# Patient Record
Sex: FEMALE | Race: WHITE | NOT HISPANIC OR LATINO | Employment: PART TIME | URBAN - METROPOLITAN AREA
[De-identification: names, ages, dates, MRNs, and addresses within clinical notes are randomized per-mention and may not be internally consistent; named-entity substitution may affect disease eponyms.]

---

## 2017-01-25 ENCOUNTER — TRANSCRIBE ORDERS (OUTPATIENT)
Dept: ADMINISTRATIVE | Facility: HOSPITAL | Age: 37
End: 2017-01-25

## 2017-01-25 ENCOUNTER — HOSPITAL ENCOUNTER (OUTPATIENT)
Dept: ULTRASOUND IMAGING | Facility: HOSPITAL | Age: 37
Discharge: HOME | End: 2017-01-25
Attending: OBSTETRICS & GYNECOLOGY | Admitting: OBSTETRICS & GYNECOLOGY

## 2017-01-25 DIAGNOSIS — N64.4 BREAST TENDERNESS IN FEMALE: Primary | ICD-10-CM

## 2017-01-25 DIAGNOSIS — N64.4 BREAST TENDERNESS IN FEMALE: ICD-10-CM

## 2017-01-25 PROCEDURE — 76641 ULTRASOUND BREAST COMPLETE: CPT

## 2017-02-14 ENCOUNTER — OFFICE VISIT (OUTPATIENT)
Dept: MAMMOGRAPHY | Facility: CLINIC | Age: 37
End: 2017-02-14

## 2017-02-14 VITALS
BODY MASS INDEX: 23.64 KG/M2 | HEIGHT: 68 IN | OXYGEN SATURATION: 98 % | TEMPERATURE: 97.8 F | SYSTOLIC BLOOD PRESSURE: 108 MMHG | DIASTOLIC BLOOD PRESSURE: 58 MMHG | HEART RATE: 102 BPM | WEIGHT: 156 LBS

## 2017-02-14 DIAGNOSIS — N64.4 MASTODYNIA: Primary | ICD-10-CM

## 2017-02-14 PROCEDURE — 99203 OFFICE O/P NEW LOW 30 MIN: CPT | Performed by: SURGERY

## 2017-02-14 RX ORDER — PRENATAL VIT NO.126/IRON/FOLIC 28MG-0.8MG
TABLET ORAL DAILY
COMMUNITY
End: 2021-03-02

## 2017-02-14 NOTE — PROGRESS NOTES
Chief Complaint: Rayna Presley is a 36 y.o.. female here today for Mass (right breast)        History of Present Illness:  Patient presents with breast pain.  She is currently 21 weeks pregnant with her third child.  This began approximately 2 months ago and is located in the right breast in the medial aspect.  There was one episode of some crusty drainage on the nipple but otherwise she has had no other symptoms.  Her OB/GYN doctor thought there was perhaps a palpable lump and sent her for an ultrasound.  This did not reveal any abnormalities in that right breast.  The pain apparently was present every day with sons spells where it goes away for short periods of time.  It mostly hurts when the area is palpated but when it is hurting, she says that it is a 6 out of 10 on the pain scale.  Her family history is significant for a paternal relative with breast cancer in her 80s.      Review of Systems:  Review of Systems   Constitutional:        30 pound weight gain- 21 weeks pregnant   Neurological: Positive for headaches.   Psychiatric/Behavioral: The patient is nervous/anxious.    All other systems reviewed and are negative.       Past Medical and Surgical History:  Breast Biopsy History:  Patient has not had a breast biopsy in the past.  Breast Cancer HIstory:  Patient does not have a past medical history of breast cancer.  Breast Operations, and year:  None    History   Smoking Status   • Current Every Day Smoker   • Packs/day: 1.00   • Types: Cigarettes   • Start date: 1994   Smokeless Tobacco   • Not on file     Obstetric History:  Patient is premenopausal, first day of last period: 09/17/2016- currently 21 weeks pregnant  Number of pregnancies:3  Number of live births: 2  Number of abortions or miscarriages: 0  Age of delivery of first child: 20  Patient breast fed, for the following lenth of time:24 months  Length of time taking birth control pills:6 years  Patient has never taken hormone  replacement    Past Surgical History   Procedure Laterality Date   •  section         Past Medical History   Diagnosis Date   • Anxiety        Prior Hospitalizations, other than for surgery or childbirth, and year:  none    Social History:  Patient is .  Patient has 2 sons.    Family History:  Family History   Problem Relation Age of Onset   • Asthma Mother    • Bleeding Disorder Mother    • Clotting disorder Mother    • Diabetes Mother    • Heart attack Mother    • Hyperlipidemia Mother    • Thyroid cancer Father 55   • Alcohol abuse Brother    • Breast cancer Paternal Grandmother 80       Vital Signs:  Vitals:    17 1049   BP: 108/58   Pulse: 102   Temp: 97.8 °F (36.6 °C)   SpO2: 98%       Medications:    Current Outpatient Prescriptions:   •  Prenatal Vit-Min-FA-Fish Oil ([facility] PRENATAL GUMMY PO), Take 2 tablets by mouth daily., Disp: , Rfl:      Physical Examination:  General Appearance:   Patient is in no distress.  She is well kept and has an average build.   Psychiatric:  Patient with appropriate mood and affect. Alert and oriented to self, time, and place.    Breast, RIGHT:  medium sized, symmetric with the contralateral side.  Breast skin is without erythema, edema, rashes.  There are no visible abnormalities upon inspection during the arm-raising maneuver or with hands on hips in the sitting position. There is no nipple retraction, discharge or nipple/areolar skin changes.There are no masses palpable in the sitting or supine positions.  The patient was not tender when I palpated her today.  The area of concern was in the medial aspect of the breast particularly in the 2 to 4 o'clock position.    Breast, LEFT:  medium sized, symmetric with the contralateral side.  Breast skin is without erythema, edema, rashes.  There are no visible abnormalities upon inspection during the arm-raising maneuver or with hands on hips in the sitting position. There is no nipple retraction, discharge or  nipple/areolar skin changes.There are no masses palpable in the sitting or supine positions.    Lymphatic:  There is no axillary, cervical, infraclavicular, or supraclavicular adenopathy bilaterally.  Eyes:  Pupils are round and reactive to light.  Cardiovascular:  Heart rate and rhythm are regular.  Respiratory:  Lungs are clear bilaterally with no crackles or wheezes in any lung field.  Gastrointestinal:  Abdomen is soft, nondistended, and nontender. There are no scars from previous surgery.    Musculoskeletal:  Good strength in all 4 extremities.   There is good range of motion in both shoulders.    Skin:  There were some petechiae scattered across the upper chest.    Assessment:  Diagnoses and all orders for this visit:    Mastodynia    Other orders  -     Prenatal Vit-Fe Fumarate-FA (PRENATAL, CLASSIC, VITAMIN) 28-0.8 MG tablet tablet; Take  by mouth Daily.      Plan:  We discussed that the differential diagnosis of breast pain includes, but is not limited to: fibrocystic condition, macrocysts, fibroadenomas, breast cancer,  trauma to the breast or chest wall, inflammation or  other musculoskeletal disturbances of the chest wall.  There are no concerning findings on her examination today or on her most recent imaging for a focal cause of her pain- ie a mass, inflammation, or trauma. Both her exam and imaging are in good order. The most likely etiology of her breast pain is fibrocystic condition, meaning a hormonal responsiveness of the breast tissue.  We discussed that this pain can be aggravated by many things, including stress, caffeine, and fluctuations in hormone levels.  We discussed the most common interventions to alleviate her symptoms, including wearing a supportive bra, reducing caffeine intake, oral vitamin E 400 units qday or BID, or evening primrose oil 2000-3000mg orally daily. She understood.     She would appear to be mostly concerned about the possibility of something bad going on.  I have  reassured her that a negative physical examination, a negative ultrasound, and a history of pain only is an unlikely scenario for breast cancer.  With her pregnancy there is not much that we could do.  I suggested that she cut out the caffeine in her diet from coffee and tea.  If that does not help we could consider vitamin E or evening primrose oil but I have asked her to check with her OB/GYN doctor to be certain he would consider that safe to do.  She has been encouraged to continue self breast examination and I will see her on an as-needed basis.      CPT coding:    Next Appointment:  No Follow-up on file.

## 2017-06-18 ENCOUNTER — HOSPITAL ENCOUNTER (INPATIENT)
Facility: HOSPITAL | Age: 37
LOS: 3 days | Discharge: HOME | End: 2017-06-21
Attending: OBSTETRICS & GYNECOLOGY | Admitting: OBSTETRICS & GYNECOLOGY

## 2017-06-18 ENCOUNTER — ANESTHESIA EVENT (OUTPATIENT)
Dept: LABOR AND DELIVERY | Facility: HOSPITAL | Age: 37
End: 2017-06-18

## 2017-06-18 ENCOUNTER — ANESTHESIA (OUTPATIENT)
Dept: LABOR AND DELIVERY | Facility: HOSPITAL | Age: 37
End: 2017-06-18

## 2017-06-18 PROBLEM — Z34.90 PREGNANCY: Status: ACTIVE | Noted: 2017-06-18

## 2017-06-18 LAB
ABO GROUP BLD: NORMAL
BASOPHILS # BLD AUTO: 0.01 10*3/MM3 (ref 0–0.2)
BASOPHILS # BLD AUTO: 0.02 10*3/MM3 (ref 0–0.2)
BASOPHILS NFR BLD AUTO: 0.1 % (ref 0–1.5)
BASOPHILS NFR BLD AUTO: 0.2 % (ref 0–1.5)
BLD GP AB SCN SERPL QL: NEGATIVE
DEPRECATED RDW RBC AUTO: 46.3 FL (ref 37–54)
DEPRECATED RDW RBC AUTO: 47.4 FL (ref 37–54)
EOSINOPHIL # BLD AUTO: 0.06 10*3/MM3 (ref 0–0.7)
EOSINOPHIL # BLD AUTO: 0.11 10*3/MM3 (ref 0–0.7)
EOSINOPHIL NFR BLD AUTO: 0.4 % (ref 0.3–6.2)
EOSINOPHIL NFR BLD AUTO: 1.1 % (ref 0.3–6.2)
ERYTHROCYTE [DISTWIDTH] IN BLOOD BY AUTOMATED COUNT: 13.9 % (ref 11.7–13)
ERYTHROCYTE [DISTWIDTH] IN BLOOD BY AUTOMATED COUNT: 14.1 % (ref 11.7–13)
HCT VFR BLD AUTO: 33.6 % (ref 35.6–45.5)
HCT VFR BLD AUTO: 34.4 % (ref 35.6–45.5)
HGB BLD-MCNC: 11.6 G/DL (ref 11.9–15.5)
HGB BLD-MCNC: 12 G/DL (ref 11.9–15.5)
IMM GRANULOCYTES # BLD: 0.07 10*3/MM3 (ref 0–0.03)
IMM GRANULOCYTES # BLD: 0.11 10*3/MM3 (ref 0–0.03)
IMM GRANULOCYTES NFR BLD: 0.5 % (ref 0–0.5)
IMM GRANULOCYTES NFR BLD: 1.1 % (ref 0–0.5)
LYMPHOCYTES # BLD AUTO: 1.83 10*3/MM3 (ref 0.9–4.8)
LYMPHOCYTES # BLD AUTO: 2.42 10*3/MM3 (ref 0.9–4.8)
LYMPHOCYTES NFR BLD AUTO: 13.2 % (ref 19.6–45.3)
LYMPHOCYTES NFR BLD AUTO: 23.3 % (ref 19.6–45.3)
MCH RBC QN AUTO: 32 PG (ref 26.9–32)
MCH RBC QN AUTO: 32.3 PG (ref 26.9–32)
MCHC RBC AUTO-ENTMCNC: 34.5 G/DL (ref 32.4–36.3)
MCHC RBC AUTO-ENTMCNC: 34.9 G/DL (ref 32.4–36.3)
MCV RBC AUTO: 91.7 FL (ref 80.5–98.2)
MCV RBC AUTO: 93.6 FL (ref 80.5–98.2)
MONOCYTES # BLD AUTO: 0.48 10*3/MM3 (ref 0.2–1.2)
MONOCYTES # BLD AUTO: 0.71 10*3/MM3 (ref 0.2–1.2)
MONOCYTES NFR BLD AUTO: 3.5 % (ref 5–12)
MONOCYTES NFR BLD AUTO: 6.8 % (ref 5–12)
NEUTROPHILS # BLD AUTO: 11.39 10*3/MM3 (ref 1.9–8.1)
NEUTROPHILS # BLD AUTO: 7 10*3/MM3 (ref 1.9–8.1)
NEUTROPHILS NFR BLD AUTO: 67.5 % (ref 42.7–76)
NEUTROPHILS NFR BLD AUTO: 82.3 % (ref 42.7–76)
PLATELET # BLD AUTO: 166 10*3/MM3 (ref 140–500)
PLATELET # BLD AUTO: 217 10*3/MM3 (ref 140–500)
PMV BLD AUTO: 9.6 FL (ref 6–12)
PMV BLD AUTO: 9.8 FL (ref 6–12)
RBC # BLD AUTO: 3.59 10*6/MM3 (ref 3.9–5.2)
RBC # BLD AUTO: 3.75 10*6/MM3 (ref 3.9–5.2)
RH BLD: POSITIVE
WBC NRBC COR # BLD: 10.37 10*3/MM3 (ref 4.5–10.7)
WBC NRBC COR # BLD: 13.84 10*3/MM3 (ref 4.5–10.7)

## 2017-06-18 PROCEDURE — 0UL70CZ OCCLUSION OF BILATERAL FALLOPIAN TUBES WITH EXTRALUMINAL DEVICE, OPEN APPROACH: ICD-10-PCS | Performed by: OBSTETRICS & GYNECOLOGY

## 2017-06-18 PROCEDURE — 85025 COMPLETE CBC W/AUTO DIFF WBC: CPT | Performed by: OBSTETRICS & GYNECOLOGY

## 2017-06-18 PROCEDURE — 94799 UNLISTED PULMONARY SVC/PX: CPT

## 2017-06-18 PROCEDURE — 25010000002 PHENYLEPHRINE PER 1 ML: Performed by: ANESTHESIOLOGY

## 2017-06-18 PROCEDURE — 25010000002 KETOROLAC TROMETHAMINE PER 15 MG: Performed by: ANESTHESIOLOGY

## 2017-06-18 PROCEDURE — 25010000003 CEFAZOLIN IN DEXTROSE 2-4 GM/100ML-% SOLUTION: Performed by: OBSTETRICS & GYNECOLOGY

## 2017-06-18 PROCEDURE — 86901 BLOOD TYPING SEROLOGIC RH(D): CPT | Performed by: OBSTETRICS & GYNECOLOGY

## 2017-06-18 PROCEDURE — 86900 BLOOD TYPING SEROLOGIC ABO: CPT | Performed by: OBSTETRICS & GYNECOLOGY

## 2017-06-18 PROCEDURE — 25010000002 ONDANSETRON PER 1 MG: Performed by: ANESTHESIOLOGY

## 2017-06-18 PROCEDURE — 86850 RBC ANTIBODY SCREEN: CPT | Performed by: OBSTETRICS & GYNECOLOGY

## 2017-06-18 PROCEDURE — 25010000002 MORPHINE PER 10 MG: Performed by: ANESTHESIOLOGY

## 2017-06-18 DEVICE — CLIP FALLOP FILSHIE TI PR STRL BX/20: Type: IMPLANTABLE DEVICE | Site: FALLOPIAN TUBE | Status: FUNCTIONAL

## 2017-06-18 RX ORDER — BISACODYL 5 MG/1
10 TABLET, DELAYED RELEASE ORAL DAILY PRN
Status: DISCONTINUED | OUTPATIENT
Start: 2017-06-18 | End: 2017-06-21 | Stop reason: HOSPADM

## 2017-06-18 RX ORDER — ONDANSETRON 2 MG/ML
4 INJECTION INTRAMUSCULAR; INTRAVENOUS ONCE AS NEEDED
Status: ACTIVE | OUTPATIENT
Start: 2017-06-18 | End: 2017-06-19

## 2017-06-18 RX ORDER — CARBOPROST TROMETHAMINE 250 UG/ML
250 INJECTION, SOLUTION INTRAMUSCULAR ONCE
Status: DISCONTINUED | OUTPATIENT
Start: 2017-06-18 | End: 2017-06-21 | Stop reason: HOSPADM

## 2017-06-18 RX ORDER — OXYTOCIN/RINGER'S LACTATE 10/500ML
125 PLASTIC BAG, INJECTION (ML) INTRAVENOUS CONTINUOUS PRN
Status: DISCONTINUED | OUTPATIENT
Start: 2017-06-18 | End: 2017-06-18 | Stop reason: HOSPADM

## 2017-06-18 RX ORDER — EPHEDRINE SULFATE 50 MG/ML
INJECTION, SOLUTION INTRAVENOUS AS NEEDED
Status: DISCONTINUED | OUTPATIENT
Start: 2017-06-18 | End: 2017-06-18 | Stop reason: SURG

## 2017-06-18 RX ORDER — BUPIVACAINE HYDROCHLORIDE 7.5 MG/ML
INJECTION, SOLUTION EPIDURAL; RETROBULBAR AS NEEDED
Status: DISCONTINUED | OUTPATIENT
Start: 2017-06-18 | End: 2017-06-18 | Stop reason: SURG

## 2017-06-18 RX ORDER — HYDROCODONE BITARTRATE AND ACETAMINOPHEN 10; 325 MG/1; MG/1
1 TABLET ORAL EVERY 4 HOURS PRN
Status: DISCONTINUED | OUTPATIENT
Start: 2017-06-18 | End: 2017-06-21 | Stop reason: HOSPADM

## 2017-06-18 RX ORDER — CEFAZOLIN SODIUM 2 G/100ML
2 INJECTION, SOLUTION INTRAVENOUS ONCE
Status: COMPLETED | OUTPATIENT
Start: 2017-06-18 | End: 2017-06-18

## 2017-06-18 RX ORDER — LIDOCAINE HYDROCHLORIDE 10 MG/ML
5 INJECTION, SOLUTION INFILTRATION; PERINEURAL AS NEEDED
Status: DISCONTINUED | OUTPATIENT
Start: 2017-06-18 | End: 2017-06-18 | Stop reason: HOSPADM

## 2017-06-18 RX ORDER — ACETAMINOPHEN 325 MG/1
650 TABLET ORAL EVERY 4 HOURS PRN
Status: DISCONTINUED | OUTPATIENT
Start: 2017-06-18 | End: 2017-06-21 | Stop reason: HOSPADM

## 2017-06-18 RX ORDER — PROMETHAZINE HYDROCHLORIDE 25 MG/ML
12.5 INJECTION, SOLUTION INTRAMUSCULAR; INTRAVENOUS EVERY 6 HOURS PRN
Status: DISCONTINUED | OUTPATIENT
Start: 2017-06-18 | End: 2017-06-21 | Stop reason: HOSPADM

## 2017-06-18 RX ORDER — PROMETHAZINE HYDROCHLORIDE 25 MG/1
12.5 TABLET ORAL EVERY 4 HOURS PRN
Status: DISCONTINUED | OUTPATIENT
Start: 2017-06-18 | End: 2017-06-21 | Stop reason: HOSPADM

## 2017-06-18 RX ORDER — MISOPROSTOL 200 UG/1
800 TABLET ORAL AS NEEDED
Status: DISCONTINUED | OUTPATIENT
Start: 2017-06-18 | End: 2017-06-18 | Stop reason: HOSPADM

## 2017-06-18 RX ORDER — HYDROXYZINE 50 MG/1
50 TABLET, FILM COATED ORAL EVERY 6 HOURS PRN
Status: DISCONTINUED | OUTPATIENT
Start: 2017-06-18 | End: 2017-06-21 | Stop reason: HOSPADM

## 2017-06-18 RX ORDER — DIPHENHYDRAMINE HCL 25 MG
25 CAPSULE ORAL EVERY 4 HOURS PRN
Status: DISCONTINUED | OUTPATIENT
Start: 2017-06-18 | End: 2017-06-21 | Stop reason: HOSPADM

## 2017-06-18 RX ORDER — IBUPROFEN 600 MG/1
600 TABLET ORAL EVERY 8 HOURS PRN
Status: DISCONTINUED | OUTPATIENT
Start: 2017-06-18 | End: 2017-06-21 | Stop reason: HOSPADM

## 2017-06-18 RX ORDER — DIPHENHYDRAMINE HYDROCHLORIDE 50 MG/ML
25 INJECTION INTRAMUSCULAR; INTRAVENOUS EVERY 4 HOURS PRN
Status: DISCONTINUED | OUTPATIENT
Start: 2017-06-18 | End: 2017-06-21 | Stop reason: HOSPADM

## 2017-06-18 RX ORDER — MORPHINE SULFATE 1 MG/ML
INJECTION, SOLUTION EPIDURAL; INTRATHECAL; INTRAVENOUS AS NEEDED
Status: DISCONTINUED | OUTPATIENT
Start: 2017-06-18 | End: 2017-06-18 | Stop reason: SURG

## 2017-06-18 RX ORDER — CEFAZOLIN SODIUM 2 G/100ML
2 INJECTION, SOLUTION INTRAVENOUS EVERY 8 HOURS
Status: DISCONTINUED | OUTPATIENT
Start: 2017-06-18 | End: 2017-06-19

## 2017-06-18 RX ORDER — NALOXONE HCL 0.4 MG/ML
0.2 VIAL (ML) INJECTION
Status: DISCONTINUED | OUTPATIENT
Start: 2017-06-18 | End: 2017-06-21 | Stop reason: HOSPADM

## 2017-06-18 RX ORDER — KETOROLAC TROMETHAMINE 30 MG/ML
INJECTION, SOLUTION INTRAMUSCULAR; INTRAVENOUS AS NEEDED
Status: DISCONTINUED | OUTPATIENT
Start: 2017-06-18 | End: 2017-06-18 | Stop reason: SURG

## 2017-06-18 RX ORDER — ONDANSETRON 2 MG/ML
INJECTION INTRAMUSCULAR; INTRAVENOUS AS NEEDED
Status: DISCONTINUED | OUTPATIENT
Start: 2017-06-18 | End: 2017-06-18 | Stop reason: SURG

## 2017-06-18 RX ORDER — METHYLERGONOVINE MALEATE 0.2 MG/ML
200 INJECTION INTRAVENOUS ONCE AS NEEDED
Status: DISCONTINUED | OUTPATIENT
Start: 2017-06-18 | End: 2017-06-18 | Stop reason: HOSPADM

## 2017-06-18 RX ORDER — OXYCODONE HYDROCHLORIDE AND ACETAMINOPHEN 5; 325 MG/1; MG/1
2 TABLET ORAL EVERY 4 HOURS PRN
Status: DISCONTINUED | OUTPATIENT
Start: 2017-06-18 | End: 2017-06-21 | Stop reason: HOSPADM

## 2017-06-18 RX ORDER — ZOLPIDEM TARTRATE 5 MG/1
5 TABLET ORAL NIGHTLY PRN
Status: DISCONTINUED | OUTPATIENT
Start: 2017-06-18 | End: 2017-06-21 | Stop reason: HOSPADM

## 2017-06-18 RX ORDER — PRENATAL VIT NO.126/IRON/FOLIC 28MG-0.8MG
1 TABLET ORAL DAILY
Status: DISCONTINUED | OUTPATIENT
Start: 2017-06-18 | End: 2017-06-21 | Stop reason: HOSPADM

## 2017-06-18 RX ORDER — MORPHINE SULFATE 1 MG/ML
INJECTION, SOLUTION EPIDURAL; INTRATHECAL; INTRAVENOUS
Status: DISPENSED
Start: 2017-06-18 | End: 2017-06-18

## 2017-06-18 RX ORDER — SIMETHICONE 80 MG
80 TABLET,CHEWABLE ORAL 4 TIMES DAILY PRN
Status: DISCONTINUED | OUTPATIENT
Start: 2017-06-18 | End: 2017-06-21 | Stop reason: HOSPADM

## 2017-06-18 RX ORDER — CEFAZOLIN SODIUM 2 G/100ML
2 INJECTION, SOLUTION INTRAVENOUS EVERY 8 HOURS
Status: DISCONTINUED | OUTPATIENT
Start: 2017-06-18 | End: 2017-06-18 | Stop reason: SDUPTHER

## 2017-06-18 RX ORDER — OXYTOCIN/RINGER'S LACTATE 10/500ML
999 PLASTIC BAG, INJECTION (ML) INTRAVENOUS ONCE
Status: COMPLETED | OUTPATIENT
Start: 2017-06-18 | End: 2017-06-18

## 2017-06-18 RX ORDER — SENNA AND DOCUSATE SODIUM 50; 8.6 MG/1; MG/1
1 TABLET, FILM COATED ORAL 2 TIMES DAILY
Status: DISCONTINUED | OUTPATIENT
Start: 2017-06-18 | End: 2017-06-21 | Stop reason: HOSPADM

## 2017-06-18 RX ORDER — SODIUM CHLORIDE 0.9 % (FLUSH) 0.9 %
1-10 SYRINGE (ML) INJECTION AS NEEDED
Status: DISCONTINUED | OUTPATIENT
Start: 2017-06-18 | End: 2017-06-18 | Stop reason: HOSPADM

## 2017-06-18 RX ORDER — CARBOPROST TROMETHAMINE 250 UG/ML
250 INJECTION, SOLUTION INTRAMUSCULAR AS NEEDED
Status: DISCONTINUED | OUTPATIENT
Start: 2017-06-18 | End: 2017-06-18 | Stop reason: HOSPADM

## 2017-06-18 RX ORDER — HYDROMORPHONE HYDROCHLORIDE 1 MG/ML
0.5 INJECTION, SOLUTION INTRAMUSCULAR; INTRAVENOUS; SUBCUTANEOUS
Status: DISCONTINUED | OUTPATIENT
Start: 2017-06-18 | End: 2017-06-18 | Stop reason: HOSPADM

## 2017-06-18 RX ORDER — PHYTONADIONE 1 MG/.5ML
INJECTION, EMULSION INTRAMUSCULAR; INTRAVENOUS; SUBCUTANEOUS
Status: DISPENSED
Start: 2017-06-18 | End: 2017-06-18

## 2017-06-18 RX ORDER — METHYLERGONOVINE MALEATE 0.2 MG/ML
200 INJECTION INTRAVENOUS ONCE
Status: DISCONTINUED | OUTPATIENT
Start: 2017-06-18 | End: 2017-06-21 | Stop reason: HOSPADM

## 2017-06-18 RX ORDER — MORPHINE SULFATE 2 MG/ML
2 INJECTION, SOLUTION INTRAMUSCULAR; INTRAVENOUS
Status: ACTIVE | OUTPATIENT
Start: 2017-06-18 | End: 2017-06-19

## 2017-06-18 RX ORDER — ONDANSETRON 2 MG/ML
4 INJECTION INTRAMUSCULAR; INTRAVENOUS EVERY 6 HOURS PRN
Status: DISCONTINUED | OUTPATIENT
Start: 2017-06-18 | End: 2017-06-21 | Stop reason: HOSPADM

## 2017-06-18 RX ORDER — SODIUM CHLORIDE, SODIUM LACTATE, POTASSIUM CHLORIDE, CALCIUM CHLORIDE 600; 310; 30; 20 MG/100ML; MG/100ML; MG/100ML; MG/100ML
125 INJECTION, SOLUTION INTRAVENOUS CONTINUOUS
Status: DISCONTINUED | OUTPATIENT
Start: 2017-06-18 | End: 2017-06-21 | Stop reason: HOSPADM

## 2017-06-18 RX ORDER — ERYTHROMYCIN 5 MG/G
OINTMENT OPHTHALMIC
Status: DISPENSED
Start: 2017-06-18 | End: 2017-06-18

## 2017-06-18 RX ADMIN — OXYTOCIN 750 ML/HR: 10 INJECTION, SOLUTION INTRAMUSCULAR; INTRAVENOUS at 04:19

## 2017-06-18 RX ADMIN — ONDANSETRON 4 MG: 2 INJECTION INTRAMUSCULAR; INTRAVENOUS at 03:40

## 2017-06-18 RX ADMIN — KETOROLAC TROMETHAMINE 30 MG: 30 INJECTION, SOLUTION INTRAMUSCULAR; INTRAVENOUS at 03:40

## 2017-06-18 RX ADMIN — SIMETHICONE CHEW TAB 80 MG 80 MG: 80 TABLET ORAL at 08:53

## 2017-06-18 RX ADMIN — Medication 1 TABLET: at 08:53

## 2017-06-18 RX ADMIN — BUPIVACAINE HYDROCHLORIDE 1.6 ML: 7.5 INJECTION, SOLUTION EPIDURAL; RETROBULBAR at 03:43

## 2017-06-18 RX ADMIN — PHENYLEPHRINE HYDROCHLORIDE 100 MCG: 10 INJECTION INTRAVENOUS at 03:50

## 2017-06-18 RX ADMIN — CEFAZOLIN SODIUM 2 G: 2 INJECTION, SOLUTION INTRAVENOUS at 12:24

## 2017-06-18 RX ADMIN — IBUPROFEN 600 MG: 600 TABLET ORAL at 16:43

## 2017-06-18 RX ADMIN — SIMETHICONE CHEW TAB 80 MG 80 MG: 80 TABLET ORAL at 12:25

## 2017-06-18 RX ADMIN — PHENYLEPHRINE HYDROCHLORIDE 100 MCG: 10 INJECTION INTRAVENOUS at 03:55

## 2017-06-18 RX ADMIN — IBUPROFEN 600 MG: 600 TABLET ORAL at 08:52

## 2017-06-18 RX ADMIN — EPHEDRINE SULFATE 10 MG: 50 INJECTION INTRAMUSCULAR; INTRAVENOUS; SUBCUTANEOUS at 04:16

## 2017-06-18 RX ADMIN — HYDROCODONE BITARTRATE AND ACETAMINOPHEN 1 TABLET: 10; 325 TABLET ORAL at 22:26

## 2017-06-18 RX ADMIN — CEFAZOLIN SODIUM 2 G: 2 INJECTION, SOLUTION INTRAVENOUS at 03:45

## 2017-06-18 RX ADMIN — CEFAZOLIN SODIUM 2 G: 2 INJECTION, SOLUTION INTRAVENOUS at 22:25

## 2017-06-18 RX ADMIN — OXYTOCIN 125 ML/HR: 10 INJECTION, SOLUTION INTRAMUSCULAR; INTRAVENOUS at 05:50

## 2017-06-18 RX ADMIN — DOCUSATE SODIUM,SENNOSIDES 1 TABLET: 50; 8.6 TABLET, FILM COATED ORAL at 08:53

## 2017-06-18 RX ADMIN — DOCUSATE SODIUM,SENNOSIDES 1 TABLET: 50; 8.6 TABLET, FILM COATED ORAL at 17:21

## 2017-06-18 RX ADMIN — OXYCODONE HYDROCHLORIDE AND ACETAMINOPHEN 2 TABLET: 5; 325 TABLET ORAL at 15:07

## 2017-06-18 RX ADMIN — OXYTOCIN 1500 ML/HR: 10 INJECTION, SOLUTION INTRAMUSCULAR; INTRAVENOUS at 03:59

## 2017-06-18 RX ADMIN — MORPHINE SULFATE 0.25 MG: 1 INJECTION EPIDURAL; INTRATHECAL; INTRAVENOUS at 03:43

## 2017-06-18 RX ADMIN — HYDROCODONE BITARTRATE AND ACETAMINOPHEN 1 TABLET: 10; 325 TABLET ORAL at 10:14

## 2017-06-18 RX ADMIN — SODIUM CHLORIDE, POTASSIUM CHLORIDE, SODIUM LACTATE AND CALCIUM CHLORIDE: 600; 310; 30; 20 INJECTION, SOLUTION INTRAVENOUS at 03:40

## 2017-06-18 RX ADMIN — SODIUM CHLORIDE, POTASSIUM CHLORIDE, SODIUM LACTATE AND CALCIUM CHLORIDE 1000 ML: 600; 310; 30; 20 INJECTION, SOLUTION INTRAVENOUS at 03:00

## 2017-06-18 RX ADMIN — SIMETHICONE CHEW TAB 80 MG 80 MG: 80 TABLET ORAL at 17:18

## 2017-06-18 NOTE — ANESTHESIA PROCEDURE NOTES
Intrathecal Block    Patient location during procedure: OR  Performed By  Anesthesiologist: NORA LANE  Preanesthetic Checklist  Completed: patient identified, site marked, surgical consent, pre-op evaluation, timeout performed, IV checked, risks and benefits discussed and monitors and equipment checked  Intrathecal Block Prep:  Pt Position:sitting  Sterile Tech:sterile barrier, gloves, cap and mask  Prep:chloraprep  Monitoring:blood pressure monitoring, continuous pulse oximetry and EKG  Intrathecal Block Procedure:  Approach:midline  Location:L3-L4  Needle Type:Princess  Needle Gauge:25 G  Placement of Needle Event: cerebrospinal fluid  Fluid Appearance:clear  Post Assessment:  Patient Tolerance:patient tolerated the procedure well with no apparent complications

## 2017-06-18 NOTE — OP NOTE
DATE OF PROCEDURE:  2017    SURGEON:  Nate Rankin MD     ASSISTANT: JAMAR Bee      PROCEDURES PERFORMED:  1. Repeat  section.   2. Tubal sterilization.     PREOPERATIVE DIAGNOSIS: Term pregnancy, labor with ruptured membranes.     POSTOPERATIVE DIAGNOSIS: Term pregnancy, labor with ruptured membranes.     DESCRIPTION OF PROCEDURE: Under uncomplicated spinal anesthesia with the patient in the supine position with left uterine displacement, abdominal prep and drape was performed. The abdomen was entered through the previous Pfannenstiel incision.  Moderate scarring was encountered. The abdominal cavity was reasonably free of scar.  An incision was made in the midline of the lower segment and widened laterally by finger pressure. The amniotic fluid was clear. The baby was in the vertex position.  This was a good size infant and there was a little trouble getting the head, but it was done successfully and the baby was an 8-pound 1-ounce female infant.  Apgar scores were not available at this time, but the baby is remaining with the mother in the recovery room. The placenta was removed and appeared intact.  The uterine incision was normal Bleeding was satisfactory. The uterine incision was closed with a running locking 0 Vicryl and several additional [id number] were used near the left angle.  The tubes and ovaries were grossly normal. Tubal sterilization was performed at the patient's request.  Filshie clips were used and 2 clips were placed on each tube next to each other. There was no bleeding and clip application was good. A small amount of blood was cleaned from the abdominal gutters. The peritoneum was closed with a running 0 Vicryl. The fascia was closed with a running 0 Vicryl. The subcutaneous was dry. The skin was closed with staples and a pressure dressing applied. Blood loss was normal estimated at 500 mL.  Counts were correct. There were no operative complications. The  patient tolerated the delivery well.     Nate Rankin M.D.  RLS:davey  D:   06/18/2017 05:22:52  T:   06/18/2017 07:00:47  Job ID:   [id number]  Document ID:   [id number]  cc:

## 2017-06-18 NOTE — ANESTHESIA POSTPROCEDURE EVALUATION
Patient: Rayna Presley    Procedure Summary     Date Anesthesia Start Anesthesia Stop Room / Location    17 0339 [id number]  [facility]       Procedure Diagnosis Surgeon Provider     SECTION REPEAT WITH TUBAL (N/A Abdomen) (pregnancy) MD Pablo Pantoja MD          Anesthesia Type: spinal  Last vitals  BP      Temp      Pulse     Resp      SpO2        Post Anesthesia Care and Evaluation    Patient location during evaluation: PACU  Anesthetic complications: No anesthetic complications

## 2017-06-18 NOTE — H&P
CHIEF COMPLAINT: The patient is scheduled for surgery on 2017, but she is in labor with ruptured membranes and is 4 cm, so she will be having her  now.     HISTORY OF PRESENT ILLNESS: The patient is a 36-year-old, white,  3, para 2. Her EDC is 2017 by an early ultrasound. Her 2 previous deliveries were by  after the first was for CPD with a 9-pound, 12-ounce boy. Her prenatal course has been free of serious complications. An early ultrasound showed a placenta previa, but this had resolved on a repeat scan in late 2017. Weight gain is normal. Blood pressures are good. Blood type is A positive. Rubella is immune. HIV, hepatitis and alpha-fetoprotein levels are negative, normal. Group B strep was negative. Blood sugar was normal. She is diane, is 4 cm dilated, and membranes ruptured. She will be prepared for surgery.     PHYSICAL EXAMINATION:  CHEST: Clear.   HEART: Normal sinus rhythm without murmur.   BREASTS: Nontender, no masses.   ABDOMEN: Uterus term size. Fetal heart tones normal.   PELVIC: As noted, the cervix is 4 cm with ruptured membranes and clear fluid.     IMPRESSION: Repeat  section at term with labor and ruptured membranes.     PLAN: She is being prepared for a repeat  and she is to have a tubal ligation.       Nate Rankin M.D.  RLS:terry  D:   2017 03:28:22  T:   2017 04:12:10  Job ID:   [id number]  Document ID:   [id number]  cc:   [facility]  [facility]

## 2017-06-18 NOTE — ANESTHESIA PREPROCEDURE EVALUATION
Anesthesia Evaluation     Patient summary reviewed   no history of anesthetic complications:  NPO Solid Status: > 6 hours       Airway   Mallampati: III  TM distance: >3 FB  Neck ROM: full  no difficulty expected  Dental - normal exam     Pulmonary - normal exam   (+) a smoker Current,   Cardiovascular - negative cardio ROS and normal exam        Neuro/Psych  (+) psychiatric history Anxiety,    GI/Hepatic/Renal/Endo      Musculoskeletal     Abdominal    Substance History      OB/GYN    (+) Pregnant (previous  in labor with srom),         Other        ROS/Med Hx Other: Hb 12.0  Plts 217                                  Anesthesia Plan    ASA 2     spinal     Anesthetic plan and risks discussed with patient.

## 2017-06-18 NOTE — LACTATION NOTE
P3 38 wks. Reports nursing previous babies who are 16 and 14 yrs old now. Reviewed  feeding patterns. Encouraged to call for any assist.

## 2017-06-19 PROCEDURE — 25010000003 CEFAZOLIN IN DEXTROSE 2-4 GM/100ML-% SOLUTION: Performed by: OBSTETRICS & GYNECOLOGY

## 2017-06-19 RX ADMIN — Medication 1 TABLET: at 09:19

## 2017-06-19 RX ADMIN — OXYCODONE HYDROCHLORIDE AND ACETAMINOPHEN 2 TABLET: 5; 325 TABLET ORAL at 02:34

## 2017-06-19 RX ADMIN — OXYCODONE HYDROCHLORIDE AND ACETAMINOPHEN 2 TABLET: 5; 325 TABLET ORAL at 18:42

## 2017-06-19 RX ADMIN — OXYCODONE HYDROCHLORIDE AND ACETAMINOPHEN 2 TABLET: 5; 325 TABLET ORAL at 14:33

## 2017-06-19 RX ADMIN — CEFAZOLIN SODIUM 2 G: 2 INJECTION, SOLUTION INTRAVENOUS at 07:12

## 2017-06-19 RX ADMIN — IBUPROFEN 600 MG: 600 TABLET ORAL at 14:33

## 2017-06-19 RX ADMIN — OXYCODONE HYDROCHLORIDE AND ACETAMINOPHEN 2 TABLET: 5; 325 TABLET ORAL at 22:40

## 2017-06-19 RX ADMIN — IBUPROFEN 600 MG: 600 TABLET ORAL at 22:40

## 2017-06-19 RX ADMIN — SIMETHICONE CHEW TAB 80 MG 80 MG: 80 TABLET ORAL at 09:20

## 2017-06-19 RX ADMIN — DOCUSATE SODIUM,SENNOSIDES 1 TABLET: 50; 8.6 TABLET, FILM COATED ORAL at 18:42

## 2017-06-19 RX ADMIN — IBUPROFEN 600 MG: 600 TABLET ORAL at 02:34

## 2017-06-19 RX ADMIN — DOCUSATE SODIUM,SENNOSIDES 1 TABLET: 50; 8.6 TABLET, FILM COATED ORAL at 09:19

## 2017-06-19 RX ADMIN — OXYCODONE HYDROCHLORIDE AND ACETAMINOPHEN 2 TABLET: 5; 325 TABLET ORAL at 09:20

## 2017-06-19 NOTE — PLAN OF CARE
Problem: Patient Care Overview (Adult)  Goal: Plan of Care Review  Outcome: Ongoing (interventions implemented as appropriate)    17 0225   Coping/Psychosocial Response Interventions   Plan Of Care Reviewed With patient   Patient Care Overview   Progress improving       Goal: Adult Individualization and Mutuality  Outcome: Ongoing (interventions implemented as appropriate)  Goal: Discharge Needs Assessment  Outcome: Ongoing (interventions implemented as appropriate)    Problem: Postpartum ( Delivery) (Adult)  Goal: Signs and Symptoms of Listed Potential Problems Will be Absent or Manageable (Postpartum)  Outcome: Ongoing (interventions implemented as appropriate)    Problem: Breastfeeding (Adult,NICU,,Obstetrics,Pediatric)  Goal: Signs and Symptoms of Listed Potential Problems Will be Absent or Manageable (Breastfeeding)  Outcome: Ongoing (interventions implemented as appropriate)

## 2017-06-19 NOTE — LACTATION NOTE
Mom denies assistance or questions. She reports baby nursing well. Encouraged to call if has questions or needs assistance.

## 2017-06-20 RX ADMIN — OXYCODONE HYDROCHLORIDE AND ACETAMINOPHEN 2 TABLET: 5; 325 TABLET ORAL at 10:07

## 2017-06-20 RX ADMIN — OXYCODONE HYDROCHLORIDE AND ACETAMINOPHEN 2 TABLET: 5; 325 TABLET ORAL at 18:06

## 2017-06-20 RX ADMIN — Medication 1 TABLET: at 08:50

## 2017-06-20 RX ADMIN — IBUPROFEN 600 MG: 600 TABLET ORAL at 07:13

## 2017-06-20 RX ADMIN — DOCUSATE SODIUM,SENNOSIDES 1 TABLET: 50; 8.6 TABLET, FILM COATED ORAL at 08:50

## 2017-06-20 RX ADMIN — OXYCODONE HYDROCHLORIDE AND ACETAMINOPHEN 2 TABLET: 5; 325 TABLET ORAL at 14:11

## 2017-06-20 RX ADMIN — OXYCODONE HYDROCHLORIDE AND ACETAMINOPHEN 2 TABLET: 5; 325 TABLET ORAL at 05:25

## 2017-06-20 RX ADMIN — OXYCODONE HYDROCHLORIDE AND ACETAMINOPHEN 2 TABLET: 5; 325 TABLET ORAL at 22:14

## 2017-06-20 RX ADMIN — DOCUSATE SODIUM,SENNOSIDES 1 TABLET: 50; 8.6 TABLET, FILM COATED ORAL at 18:06

## 2017-06-20 RX ADMIN — IBUPROFEN 600 MG: 600 TABLET ORAL at 15:40

## 2017-06-20 NOTE — PLAN OF CARE
Problem: Patient Care Overview (Adult)  Goal: Plan of Care Review  Outcome: Ongoing (interventions implemented as appropriate)    17 0056   Coping/Psychosocial Response Interventions   Plan Of Care Reviewed With patient   Patient Care Overview   Progress improving       Goal: Adult Individualization and Mutuality  Outcome: Ongoing (interventions implemented as appropriate)  Goal: Discharge Needs Assessment  Outcome: Ongoing (interventions implemented as appropriate)    Problem: Postpartum ( Delivery) (Adult)  Goal: Signs and Symptoms of Listed Potential Problems Will be Absent or Manageable (Postpartum)  Outcome: Ongoing (interventions implemented as appropriate)    Problem: Breastfeeding (Adult,NICU,,Obstetrics,Pediatric)  Goal: Signs and Symptoms of Listed Potential Problems Will be Absent or Manageable (Breastfeeding)  Outcome: Ongoing (interventions implemented as appropriate)

## 2017-06-20 NOTE — LACTATION NOTE
Mom reports baby is nursing well. She denies questions or needing assistance. Encouraged to call if needed.

## 2017-06-21 VITALS
WEIGHT: 178 LBS | DIASTOLIC BLOOD PRESSURE: 59 MMHG | OXYGEN SATURATION: 96 % | RESPIRATION RATE: 16 BRPM | HEIGHT: 68 IN | TEMPERATURE: 97.5 F | SYSTOLIC BLOOD PRESSURE: 101 MMHG | BODY MASS INDEX: 26.98 KG/M2 | HEART RATE: 72 BPM

## 2017-06-21 PROBLEM — Z34.90 PREGNANCY: Status: RESOLVED | Noted: 2017-06-18 | Resolved: 2017-06-21

## 2017-06-21 RX ADMIN — SIMETHICONE CHEW TAB 80 MG 80 MG: 80 TABLET ORAL at 08:47

## 2017-06-21 RX ADMIN — OXYCODONE HYDROCHLORIDE AND ACETAMINOPHEN 2 TABLET: 5; 325 TABLET ORAL at 13:39

## 2017-06-21 RX ADMIN — IBUPROFEN 600 MG: 600 TABLET ORAL at 13:39

## 2017-06-21 RX ADMIN — DOCUSATE SODIUM,SENNOSIDES 1 TABLET: 50; 8.6 TABLET, FILM COATED ORAL at 08:47

## 2017-06-21 RX ADMIN — OXYCODONE HYDROCHLORIDE AND ACETAMINOPHEN 2 TABLET: 5; 325 TABLET ORAL at 08:47

## 2017-06-21 RX ADMIN — IBUPROFEN 600 MG: 600 TABLET ORAL at 04:27

## 2017-06-21 RX ADMIN — OXYCODONE HYDROCHLORIDE AND ACETAMINOPHEN 2 TABLET: 5; 325 TABLET ORAL at 04:27

## 2017-06-21 RX ADMIN — Medication 1 TABLET: at 08:47

## 2017-06-21 NOTE — PLAN OF CARE
Problem: Patient Care Overview (Adult)  Goal: Plan of Care Review  Outcome: Ongoing (interventions implemented as appropriate)    17 0338   Coping/Psychosocial Response Interventions   Plan Of Care Reviewed With patient   Patient Care Overview   Progress improving       Goal: Adult Individualization and Mutuality  Outcome: Ongoing (interventions implemented as appropriate)  Goal: Discharge Needs Assessment  Outcome: Ongoing (interventions implemented as appropriate)    Problem: Postpartum ( Delivery) (Adult)  Goal: Signs and Symptoms of Listed Potential Problems Will be Absent or Manageable (Postpartum)  Outcome: Ongoing (interventions implemented as appropriate)    Problem: Breastfeeding (Adult,NICU,,Obstetrics,Pediatric)  Goal: Signs and Symptoms of Listed Potential Problems Will be Absent or Manageable (Breastfeeding)  Outcome: Ongoing (interventions implemented as appropriate)

## 2017-06-21 NOTE — DISCHARGE SUMMARY
Afebrile  No complaints  Ready for discharge home   See in 2 weeks for staples   rx Percocet5 and Motrin 600    Patient was at 38 weeks and entered in labor with ROM   Underwent uncomplicated c section and tubal ligation   Benign postop course   Home day 3

## 2017-06-21 NOTE — LACTATION NOTE
Discharge today.  Wt loss and output wnl.  Denies questions/concerns.  FOB mentioned she had some axillary breast tissue and discussed icing that area.  [facility] [place] card given and encouraged follow up as needed.

## 2018-01-03 ENCOUNTER — TRANSCRIBE ORDERS (OUTPATIENT)
Dept: ADMINISTRATIVE | Facility: HOSPITAL | Age: 38
End: 2018-01-03

## 2018-01-03 DIAGNOSIS — Z12.31 VISIT FOR SCREENING MAMMOGRAM: Primary | ICD-10-CM

## 2018-01-31 ENCOUNTER — HOSPITAL ENCOUNTER (OUTPATIENT)
Dept: MAMMOGRAPHY | Facility: HOSPITAL | Age: 38
Discharge: HOME | End: 2018-01-31
Attending: OBSTETRICS & GYNECOLOGY

## 2018-01-31 DIAGNOSIS — Z12.31 VISIT FOR SCREENING MAMMOGRAM: ICD-10-CM

## 2018-03-16 ENCOUNTER — HOSPITAL ENCOUNTER (OUTPATIENT)
Dept: MAMMOGRAPHY | Facility: HOSPITAL | Age: 38
Discharge: HOME | End: 2018-03-16
Admitting: OBSTETRICS & GYNECOLOGY

## 2018-03-16 PROCEDURE — 77063 BREAST TOMOSYNTHESIS BI: CPT

## 2018-03-16 PROCEDURE — 77067 SCR MAMMO BI INCL CAD: CPT

## 2021-03-02 ENCOUNTER — OFFICE VISIT (OUTPATIENT)
Dept: OBSTETRICS AND GYNECOLOGY | Facility: CLINIC | Age: 41
End: 2021-03-02

## 2021-03-02 VITALS
BODY MASS INDEX: 21.62 KG/M2 | SYSTOLIC BLOOD PRESSURE: 120 MMHG | WEIGHT: 146 LBS | DIASTOLIC BLOOD PRESSURE: 70 MMHG | HEIGHT: 69 IN

## 2021-03-02 DIAGNOSIS — Z01.419 ENCOUNTER FOR GYNECOLOGICAL EXAMINATION WITHOUT ABNORMAL FINDING: Primary | ICD-10-CM

## 2021-03-02 DIAGNOSIS — N92.0 MENORRHAGIA WITH REGULAR CYCLE: ICD-10-CM

## 2021-03-02 PROCEDURE — 99386 PREV VISIT NEW AGE 40-64: CPT | Performed by: OBSTETRICS & GYNECOLOGY

## 2021-03-02 NOTE — PATIENT INSTRUCTIONS
Menorrhagia    Menorrhagia is a condition in which menstrual periods are heavy or last longer than normal. With menorrhagia, most periods a woman has may cause enough blood loss and cramping that she becomes unable to take part in her usual activities.  What are the causes?  Common causes of this condition include:  · Noncancerous growths in the uterus (polyps or fibroids).  · An imbalance of the estrogen and progesterone hormones.  · One of the ovaries not releasing an egg during one or more months.  · A problem with the thyroid gland (hypothyroid).  · Side effects of having an intrauterine device (IUD).  · Side effects of some medicines, such as anti-inflammatory medicines or blood thinners.  · A bleeding disorder that stops the blood from clotting normally.  In some cases, the cause of this condition is not known.  What are the signs or symptoms?  Symptoms of this condition include:  · Routinely having to change your pad or tampon every 1-2 hours because it is completely soaked.  · Needing to use pads and tampons at the same time because of heavy bleeding.  · Needing to wake up to change your pads or tampons during the night.  · Passing blood clots larger than 1 inch (2.5 cm) in size.  · Having bleeding that lasts for more than 7 days.  · Having symptoms of low iron levels (anemia), such as tiredness, fatigue, or shortness of breath.  How is this diagnosed?  This condition may be diagnosed based on:  · A physical exam.  · Your symptoms and menstrual history.  · Tests, such as:  ? Blood tests to check if you are pregnant or have hormonal changes, a bleeding or thyroid disorder, anemia, or other problems.  ? Pap test to check for cancerous changes, infections, or inflammation.  ? Endometrial biopsy. This test involves removing a tissue sample from the lining of the uterus (endometrium) to be examined under a microscope.  ? Pelvic ultrasound. This test uses sound waves to create images of your uterus, ovaries, and  vagina. The images can show if you have fibroids or other growths.  ? Hysteroscopy. For this test, a small telescope is used to look inside your uterus.  How is this treated?  Treatment may not be needed for this condition. If it is needed, the best treatment for you will depend on:  · Whether you need to prevent pregnancy.  · Your desire to have children in the future.  · The cause and severity of your bleeding.  · Your personal preference.  Medicines are the first step in treatment. You may be treated with:  · Hormonal birth control methods. These treatments reduce bleeding during your menstrual period. They include:  ? Birth control pills.  ? Skin patch.  ? Vaginal ring.  ? Shots (injections) that you get every 3 months.  ? Hormonal IUD (intrauterine device).  ? Implants that go under the skin.  · Medicines that thicken blood and slow bleeding.  · Medicines that reduce swelling, such as ibuprofen.  · Medicines that contain an artificial (synthetic) hormone called progestin.  · Medicines that make the ovaries stop working for a short time.  · Iron supplements to treat anemia.  If medicines do not work, surgery may be done. Surgical options may include:  · Dilation and curettage (D&C). In this procedure, your health care provider opens (dilates) your cervix and then scrapes or suctions tissue from the endometrium to reduce menstrual bleeding.  · Operative hysteroscopy. In this procedure, a small tube with a light on the end (hysteroscope) is used to view your uterus and help remove polyps that may be causing heavy periods.  · Endometrial ablation. This is when various techniques are used to permanently destroy your entire endometrium. After endometrial ablation, most women have little or no menstrual flow. This procedure reduces your ability to become pregnant.  · Endometrial resection. In this procedure, an electrosurgical wire loop is used to remove the endometrium. This procedure reduces your ability to become  pregnant.  · Hysterectomy. This is surgical removal of the uterus. This is a permanent procedure that stops menstrual periods. Pregnancy is not possible after a hysterectomy.  Follow these instructions at home:  Medicines  · Take over-the-counter and prescription medicines exactly as told by your health care provider. This includes iron pills.  · Do not change or switch medicines without asking your health care provider.  · Do not take aspirin or medicines that contain aspirin 1 week before or during your menstrual period. Aspirin may make bleeding worse.  General instructions  · If you need to change your sanitary pad or tampon more than once every 2 hours, limit your activity until the bleeding stops.  · Iron pills can cause constipation. To prevent or treat constipation while you are taking prescription iron supplements, your health care provider may recommend that you:  ? Drink enough fluid to keep your urine clear or pale yellow.  ? Take over-the-counter or prescription medicines.  ? Eat foods that are high in fiber, such as fresh fruits and vegetables, whole grains, and beans.  ? Limit foods that are high in fat and processed sugars, such as fried and sweet foods.  · Eat well-balanced meals, including foods that are high in iron. Foods that have a lot of iron include leafy green vegetables, meat, liver, eggs, and whole grain breads and cereals.  · Do not try to lose weight until the abnormal bleeding has stopped and your blood iron level is back to normal. If you need to lose weight, work with your health care provider to lose weight safely.  · Keep all follow-up visits as told by your health care provider. This is important.  Contact a health care provider if:  · You soak through a pad or tampon every 1 or 2 hours, and this happens every time you have a period.  · You need to use pads and tampons at the same time because you are bleeding so much.  · You have nausea, vomiting, diarrhea, or other problems  related to medicines you are taking.  Get help right away if:  · You soak through more than a pad or tampon in 1 hour.  · You pass clots bigger than 1 inch (2.5 cm) wide.  · You feel short of breath.  · You feel like your heart is beating too fast.  · You feel dizzy or faint.  · You feel very weak or tired.  Summary  · Menorrhagia is a condition in which menstrual periods are heavy or last longer than normal.  · Treatment will depend on the cause of the condition and may include medicines or procedures.  · Take over-the-counter and prescription medicines exactly as told by your health care provider. This includes iron pills.  · Get help right away if you have heavy bleeding that soaks through more than a pad or tampon in 1 hour, you are passing large clots, or you feel dizzy, faint or short of breath.  This information is not intended to replace advice given to you by your health care provider. Make sure you discuss any questions you have with your health care provider.  Document Revised: 03/27/2019 Document Reviewed: 12/11/2017  [employer] Patient Education © 2020 [employer]    Endometrial Ablation  Endometrial ablation is a procedure that destroys the thin inner layer of the lining of the uterus (endometrium). This procedure may be done:  · To stop heavy periods.  · To stop bleeding that is causing anemia.  · To control irregular bleeding.  · To treat bleeding caused by small tumors (fibroids) in the endometrium.  This procedure is often an alternative to major surgery, such as removal of the uterus and cervix (hysterectomy). As a result of this procedure:  · You may not be able to have children. However, if you are premenopausal (you have not gone through menopause):  ? You may still have a small chance of getting pregnant.  ? You will need to use a reliable method of birth control after the procedure to prevent pregnancy.  · You may stop having a menstrual period, or you may have only a small amount of bleeding  during your period. Menstruation may return several years after the procedure.  Tell a health care provider about:  · Any allergies you have.  · All medicines you are taking, including vitamins, herbs, eye drops, creams, and over-the-counter medicines.  · Any problems you or family members have had with the use of anesthetic medicines.  · Any blood disorders you have.  · Any surgeries you have had.  · Any medical conditions you have.  What are the risks?  Generally, this is a safe procedure. However, problems may occur, including:  · A hole (perforation) in the uterus or bowel.  · Infection of the uterus, bladder, or vagina.  · Bleeding.  · Damage to other structures or organs.  · An air bubble in the lung (air embolus).  · Problems with pregnancy after the procedure.  · Failure of the procedure.  · Decreased ability to diagnose cancer in the endometrium.  What happens before the procedure?  · You will have tests of your endometrium to make sure there are no pre-cancerous cells or cancer cells present.  · You may have an ultrasound of the uterus.  · You may be given medicines to thin the endometrium.  · Ask your health care provider about:  ? Changing or stopping your regular medicines. This is especially important if you take diabetes medicines or blood thinners.  ? Taking medicines such as aspirin and ibuprofen. These medicines can thin your blood. Do not take these medicines before your procedure if your doctor tells you not to.  · Plan to have someone take you home from the hospital or clinic.  What happens during the procedure?    · You will lie on an exam table with your feet and legs supported as in a pelvic exam.  · To lower your risk of infection:  ? Your health care team will wash or sanitize their hands and put on germ-free (sterile) gloves.  ? Your genital area will be washed with soap.  · An IV tube will be inserted into one of your veins.  · You will be given a medicine to help you relax  (sedative).  · A surgical instrument with a light and camera (resectoscope) will be inserted into your vagina and moved into your uterus. This allows your surgeon to see inside your uterus.  · Endometrial tissue will be removed using one of the following methods:  ? Radiofrequency. This method uses a radiofrequency-alternating electric current to remove the endometrium.  ? Cryotherapy. This method uses extreme cold to freeze the endometrium.  ? Heated-free liquid. This method uses a heated saltwater (saline) solution to remove the endometrium.  ? Microwave. This method uses high-energy microwaves to heat up the endometrium and remove it.  ? Thermal balloon. This method involves inserting a catheter with a balloon tip into the uterus. The balloon tip is filled with heated fluid to remove the endometrium.  The procedure may vary among health care providers and hospitals.  What happens after the procedure?  · Your blood pressure, heart rate, breathing rate, and blood oxygen level will be monitored until the medicines you were given have worn off.  · As tissue healing occurs, you may notice vaginal bleeding for 4-6 weeks after the procedure. You may also experience:  ? Cramps.  ? Thin, watery vaginal discharge that is light pink or brown in color.  ? A need to urinate more frequently than usual.  ? Nausea.  · Do not drive for 24 hours if you were given a sedative.  · Do not have sex or insert anything into your vagina until your health care provider approves.  Summary  · Endometrial ablation is done to treat the many causes of heavy menstrual bleeding.  · The procedure may be done only after medications have been tried to control the bleeding.  · Plan to have someone take you home from the hospital or clinic.  This information is not intended to replace advice given to you by your health care provider. Make sure you discuss any questions you have with your health care provider.  Document Revised: 06/04/2019 Document  Reviewed: 01/04/2018  [employer] Patient Education © 2020 [employer]

## 2021-03-02 NOTE — PROGRESS NOTES
GYN Annual Exam     CC- Here for annual exam.     Rayna Presley is a 40 y.o. female who presents for annual well woman exam. Periods are regular every 28-30 days, lasting 7 days, 5 of these days are very heavy.  Dysmenorrhea:moderate, occurring throughout menses. Cyclic symptoms include none. No intermenstrual bleeding, spotting, or discharge.  She states she had an IUD in the past and did very well with regard to bleeding.  She then had her last pregnancy and had a  and a tubal sterilization about 3 years ago and her menses have been heavy ever since.    OB History        3    Para   3    Term   3       0    AB   0    Living   3       SAB   0    TAB   0    Ectopic   0    Molar        Multiple   0    Live Births   3          Obstetric Comments   Took birth control for 6 years.              Current contraception: tubal ligation  History of abnormal Pap smear: no  Family history of uterine, colon or ovarian cancer: no  History of abnormal mammogram: no  Family history of breast cancer: no  Last Pap : 3 or 4 years ago    Past Medical History:   Diagnosis Date   • Anxiety        Past Surgical History:   Procedure Laterality Date   •  SECTION     •  SECTION WITH TUBAL N/A 2017    Procedure:  SECTION REPEAT WITH TUBAL;  Surgeon: Nate Rankin MD;  Location: [facility] LABOR DELIVERY;  Service:    • WISDOM TOOTH EXTRACTION           Current Outpatient Medications:   •  ALPRAZolam (XANAX) 0.5 MG tablet, Take 0.5 mg by mouth 2 (two) times a day as needed for anxiety., Disp: , Rfl:     No Known Allergies    Social History     Tobacco Use   • Smoking status: Current Every Day Smoker     Packs/day: 1.00     Types: Cigarettes     Start date:    • Smokeless tobacco: Never Used   Substance Use Topics   • Alcohol use: Yes     Comment: occ   • Drug use: Yes     Types: Marijuana       Family History   Problem Relation Age of Onset   • Asthma Mother    • Bleeding  "Disorder Mother    • Clotting disorder Mother    • Diabetes Mother    • Heart attack Mother    • Hyperlipidemia Mother    • Thyroid cancer Father 55   • Alcohol abuse Brother    • Breast cancer Paternal Grandmother 80       Review of Systems    /70   Ht 175.3 cm (69\")   Wt 66.2 kg (146 lb)   LMP 02/23/2021   Breastfeeding No   BMI 21.56 kg/m²     OBGyn Exam          Assessment     1) GYN annual well woman exam.   2) menorrhagia with regular cycles.  We will get TSH and CBC and GYN ultrasound.  I briefly discussed endometrial ablation and progestin releasing IUD as her best initial options.     Plan     1) Breast Health - Clinical breast exam yearly, Discussed American cancer society recommendations for breast cancer screening, and Self breast awareness monthly.  Baseline mammogram is recommended and she will schedule it at the time of her pelvic ultrasound  2) Pap -done today.  3) Smoking status-negative  4) Encouraged to be wary of information obtained via social media and internet based on source and search.  5) Follow up prn and one year.       Mikel Siddiqi MD   3/2/2021  14:19 EST  "

## 2021-03-03 LAB
BASOPHILS # BLD AUTO: 0.06 10*3/MM3 (ref 0–0.2)
BASOPHILS NFR BLD AUTO: 0.5 % (ref 0–1.5)
EOSINOPHIL # BLD AUTO: 0.28 10*3/MM3 (ref 0–0.4)
EOSINOPHIL NFR BLD AUTO: 2.2 % (ref 0.3–6.2)
ERYTHROCYTE [DISTWIDTH] IN BLOOD BY AUTOMATED COUNT: 12.8 % (ref 12.3–15.4)
HCT VFR BLD AUTO: 39.9 % (ref 34–46.6)
HGB BLD-MCNC: 13.4 G/DL (ref 12–15.9)
IMM GRANULOCYTES # BLD AUTO: 0.15 10*3/MM3 (ref 0–0.05)
IMM GRANULOCYTES NFR BLD AUTO: 1.2 % (ref 0–0.5)
LYMPHOCYTES # BLD AUTO: 3.06 10*3/MM3 (ref 0.7–3.1)
LYMPHOCYTES NFR BLD AUTO: 24.4 % (ref 19.6–45.3)
MCH RBC QN AUTO: 31.5 PG (ref 26.6–33)
MCHC RBC AUTO-ENTMCNC: 33.6 G/DL (ref 31.5–35.7)
MCV RBC AUTO: 93.7 FL (ref 79–97)
MONOCYTES # BLD AUTO: 0.65 10*3/MM3 (ref 0.1–0.9)
MONOCYTES NFR BLD AUTO: 5.2 % (ref 5–12)
NEUTROPHILS # BLD AUTO: 8.34 10*3/MM3 (ref 1.7–7)
NEUTROPHILS NFR BLD AUTO: 66.5 % (ref 42.7–76)
NRBC BLD AUTO-RTO: 0 /100 WBC (ref 0–0.2)
PLATELET # BLD AUTO: 786 10*3/MM3 (ref 140–450)
RBC # BLD AUTO: 4.26 10*6/MM3 (ref 3.77–5.28)
TSH SERPL DL<=0.005 MIU/L-ACNC: 1.81 UIU/ML (ref 0.27–4.2)
WBC # BLD AUTO: 12.54 10*3/MM3 (ref 3.4–10.8)

## 2021-03-04 LAB
CYTOLOGIST CVX/VAG CYTO: NORMAL
CYTOLOGY CVX/VAG DOC CYTO: NORMAL
CYTOLOGY CVX/VAG DOC THIN PREP: NORMAL
DX ICD CODE: NORMAL
HIV 1 & 2 AB SER-IMP: NORMAL
HPV I/H RISK 4 DNA CVX QL PROBE+SIG AMP: NEGATIVE
OTHER STN SPEC: NORMAL
STAT OF ADQ CVX/VAG CYTO-IMP: NORMAL

## 2021-03-05 RX ORDER — METRONIDAZOLE 500 MG/1
500 TABLET ORAL 2 TIMES DAILY
Qty: 14 TABLET | Refills: 1 | Status: SHIPPED | OUTPATIENT
Start: 2021-03-05 | End: 2021-03-12

## 2021-03-12 ENCOUNTER — PROCEDURE VISIT (OUTPATIENT)
Dept: OBSTETRICS AND GYNECOLOGY | Facility: CLINIC | Age: 41
End: 2021-03-12

## 2021-03-12 ENCOUNTER — APPOINTMENT (OUTPATIENT)
Dept: WOMENS IMAGING | Facility: HOSPITAL | Age: 41
End: 2021-03-12

## 2021-03-12 DIAGNOSIS — Z12.31 VISIT FOR SCREENING MAMMOGRAM: Primary | ICD-10-CM

## 2021-03-12 PROCEDURE — 77063 BREAST TOMOSYNTHESIS BI: CPT | Performed by: RADIOLOGY

## 2021-03-12 PROCEDURE — 77067 SCR MAMMO BI INCL CAD: CPT | Performed by: OBSTETRICS & GYNECOLOGY

## 2021-03-12 PROCEDURE — 77067 SCR MAMMO BI INCL CAD: CPT | Performed by: RADIOLOGY

## 2021-03-12 PROCEDURE — 77063 BREAST TOMOSYNTHESIS BI: CPT | Performed by: OBSTETRICS & GYNECOLOGY

## 2021-03-16 ENCOUNTER — TELEPHONE (OUTPATIENT)
Dept: OBSTETRICS AND GYNECOLOGY | Facility: CLINIC | Age: 41
End: 2021-03-16

## 2021-03-16 NOTE — TELEPHONE ENCOUNTER
----- Message from Mikel Siddiqi MD sent at 3/16/2021 11:17 AM EDT -----  Mammogram read as normal

## (undated) DEVICE — SOL IRR H2O BTL 1000ML STRL

## (undated) DEVICE — KENDALL SCD EXPRESS SLEEVES, KNEE LENGTH, MEDIUM: Brand: KENDALL SCD

## (undated) DEVICE — 3M™ MICROFOAM™ TAPE 1528-4: Brand: 3M™ MICROFOAM™

## (undated) DEVICE — SUT VIC 0 CT 36IN J958H

## (undated) DEVICE — GLV SURG BIOGEL LTX PF 7 1/2

## (undated) DEVICE — PAD,ABDOMINAL,8"X10",ST,LF: Brand: MEDLINE

## (undated) DEVICE — STPLR SKIN VISISTAT WD 35CT